# Patient Record
(demographics unavailable — no encounter records)

---

## 2018-02-09 NOTE — NUR
IV removed. Catheter intact and site benign. Pressure and 4x4 applied to site. 
No bleeding noted. Patient discharged to home in stable condition. Written and 
verbal after care instructions given. Patient verbalizes understanding of 
instruction. ambulatory with a steady gait noted pt aaox4 no acute distress 
noted, resp even and unlabored. advice pt not to drive or operate any machinery 
due to pt was given narcotic medicine. pt verbalize understanding. pt s/o at 
bedside to take pt home.
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
